# Patient Record
Sex: MALE | Race: WHITE | Employment: OTHER | URBAN - METROPOLITAN AREA
[De-identification: names, ages, dates, MRNs, and addresses within clinical notes are randomized per-mention and may not be internally consistent; named-entity substitution may affect disease eponyms.]

---

## 2017-01-18 PROBLEM — G56.02 CARPAL TUNNEL SYNDROME, LEFT: Status: ACTIVE | Noted: 2017-01-18

## 2018-01-30 PROBLEM — R42 DIZZINESS: Status: ACTIVE | Noted: 2018-01-30

## 2020-10-26 PROBLEM — Z98.890 S/P ROTATOR CUFF REPAIR: Status: ACTIVE | Noted: 2020-10-26

## 2021-10-06 PROBLEM — K63.5 COLON POLYP: Status: ACTIVE | Noted: 2017-11-08

## 2023-07-24 NOTE — PROGRESS NOTES
strength, numbness or tingling. No change in gait, balance, coordination, mood, affect, memory, mentation, behavior. Psychiatric: No anxiety, no depression. Endocrine: No malaise, fatigue or temperature intolerance. No excessive thirst, fluid intake, or urination. No tremor. Hematologic/Lymphatic: No abnormal bruising or bleeding, blood clots or swollen lymph nodes. Allergic/Immunologic: No nasal congestion or hives. Physical Examination:    Vitals:    07/25/23 0819   BP: 110/62   Pulse: 63   SpO2: 96%          Constitutional and General Appearance: NAD   Respiratory:  Normal excursion and expansion without use of accessory muscles  Resp Auscultation: Normal breath sounds without dullness  Cardiovascular: The apical impulses not displaced  Heart tones are crisp and normal  Cervical veins are not engorged  The carotid upstroke is normal in amplitude and contour without delay or bruit  Normal S1S2, No S3, 1-2/6 Murmur  Peripheral pulses are symmetrical and full  There is no clubbing, cyanosis of the extremities. No edema  Femoral Arteries: 2+ and equal  Pedal Pulses: 2+ and equal   Abdomen:  No masses or tenderness  Liver/Spleen: No Abnormalities Noted  Neurological/Psychiatric:  Alert and oriented in all spheres  Moves all extremities well  Exhibits normal gait balance and coordination  No abnormalities of mood, affect, memory, mentation, or behavior are noted    Carotid dopplers 2018  Summary    1. There is moderate plaque seen in the right internal carotid artery with  an estimated diameter reduction of <50%. 2. There is moderate plaque seen in the left internal carotid artery with an  estimated diameter reduction of <50%. 3. The vertebral arteries are patent with antegrade flow bilaterally. Echocardiogram 1/2019  Summary   LV systolic function is normal with EF estimated from 55-60%. No regional wall motion abnormalities are noted. There is mild concentric left ventricular hypertrophy.

## 2023-07-25 ENCOUNTER — OFFICE VISIT (OUTPATIENT)
Dept: CARDIOLOGY CLINIC | Age: 78
End: 2023-07-25
Payer: MEDICARE

## 2023-07-25 VITALS
WEIGHT: 232.5 LBS | HEIGHT: 77 IN | HEART RATE: 63 BPM | SYSTOLIC BLOOD PRESSURE: 110 MMHG | OXYGEN SATURATION: 96 % | BODY MASS INDEX: 27.45 KG/M2 | DIASTOLIC BLOOD PRESSURE: 62 MMHG

## 2023-07-25 DIAGNOSIS — E78.5 HYPERLIPIDEMIA, UNSPECIFIED HYPERLIPIDEMIA TYPE: ICD-10-CM

## 2023-07-25 DIAGNOSIS — I25.10 CORONARY ARTERY DISEASE INVOLVING NATIVE CORONARY ARTERY OF NATIVE HEART WITHOUT ANGINA PECTORIS: ICD-10-CM

## 2023-07-25 DIAGNOSIS — I10 PRIMARY HYPERTENSION: Primary | ICD-10-CM

## 2023-07-25 DIAGNOSIS — R94.31 ABNORMAL EKG: ICD-10-CM

## 2023-07-25 DIAGNOSIS — I65.23 BILATERAL CAROTID ARTERY STENOSIS: ICD-10-CM

## 2023-07-25 DIAGNOSIS — R01.1 MURMUR: ICD-10-CM

## 2023-07-25 PROCEDURE — 3074F SYST BP LT 130 MM HG: CPT | Performed by: INTERNAL MEDICINE

## 2023-07-25 PROCEDURE — 1123F ACP DISCUSS/DSCN MKR DOCD: CPT | Performed by: INTERNAL MEDICINE

## 2023-07-25 PROCEDURE — 3078F DIAST BP <80 MM HG: CPT | Performed by: INTERNAL MEDICINE

## 2023-07-25 PROCEDURE — 99214 OFFICE O/P EST MOD 30 MIN: CPT | Performed by: INTERNAL MEDICINE

## 2023-07-25 RX ORDER — FLUOROURACIL 50 MG/G
CREAM TOPICAL
COMMUNITY

## 2023-07-25 NOTE — PATIENT INSTRUCTIONS
Plan:   ~Recommend an echocardiogram which is an ultrasound of your heart to evaluate heart function, structures and valves. ~Repeat carotid dopplers next summer   Cardiac medications reviewed including indications and pertinent side effects. Medication list updated at this visit. Check blood pressure and heart rate at home a few times per week- keep a log with dates and times and bring to office visit   Regular exercise and following a healthy diet encouraged   Follow up with me in 1 year     Your provider has ordered testing for further evaluation. An order/prescription has been included in your paper work. To schedule outpatient testing, contact Central Scheduling by calling [address] [place] [address] ([phone]).

## 2023-08-15 ENCOUNTER — HOSPITAL ENCOUNTER (OUTPATIENT)
Dept: CARDIOLOGY | Age: 78
Discharge: HOME | End: 2023-08-15
Payer: MEDICARE

## 2023-08-15 DIAGNOSIS — R94.31 ABNORMAL EKG: ICD-10-CM

## 2023-08-15 DIAGNOSIS — R01.1 MURMUR: ICD-10-CM

## 2023-08-15 DIAGNOSIS — I25.10 CORONARY ARTERY DISEASE INVOLVING NATIVE CORONARY ARTERY OF NATIVE HEART WITHOUT ANGINA PECTORIS: ICD-10-CM

## 2023-08-15 DIAGNOSIS — I10 PRIMARY HYPERTENSION: ICD-10-CM

## 2023-08-15 LAB
LV EF: 55 %
LVEF MODALITY: NORMAL

## 2023-08-15 PROCEDURE — 93306 TTE W/DOPPLER COMPLETE: CPT

## 2023-08-17 ENCOUNTER — TELEPHONE (OUTPATIENT)
Dept: CARDIOLOGY CLINIC | Age: 78
End: 2023-08-17

## 2023-08-17 NOTE — TELEPHONE ENCOUNTER
Left message for patient to call back to review echo.  When he calls back let him know-   No significant abnormality on echocardiogram.

## 2023-08-17 NOTE — TELEPHONE ENCOUNTER
Patient last seen 7/25/2023    Dr. Nikunj Sosa- please review echocardiogram     Echocardiogram 8/15/2023  Summary   Left ventricular systolic function is normal with a visually estimated   ejection fraction of 55%   No obvious regional wall motion abnormalities noted. The left ventricle is normal in size with upper limits of normal wall   thickness. Grade I diastolic dysfunction with normal LV pressure   Mild mitral annular calcification. Aortic valve appears sclerotic but opens adequately. Inadequate tricuspid valve regurgitation to estimate systolic pulmonary   artery pressure. ASSESSMENT:  CAD: Elevated CT calcium stable  Abnormal EKG  Carotid artery diease - stable  Hypertension - well controlled   Hyperlipidemia - reviewed. well controlled. Obtains thru PCP  LVH - needs recheck  Murmur - ? a little louder   Palpitations-  pvc/pac. Exacerbated by caffiene. Stable. Rarely takes extra metoprolol prn. Asymptomatic 6 beats of Non sustained ventricular tachycardia in setting of normal LV function - evaluated by EP 2014        Plan:   ~Recommend an echocardiogram which is an ultrasound of your heart to evaluate heart function, structures and valves. ~Repeat carotid dopplers next summer   Cardiac medications reviewed including indications and pertinent side effects. Medication list updated at this visit.  Cont Toprol daily w/ extra as needed   Check blood pressure and heart rate at home a few times per week- keep a log with dates and times and bring to office visit   Regular exercise and following a healthy diet encouraged   Follow up with me in 1 year

## 2023-10-10 ENCOUNTER — HOSPITAL ENCOUNTER (OUTPATIENT)
Dept: PHYSICAL THERAPY | Age: 78
Setting detail: THERAPIES SERIES
Discharge: HOME | End: 2023-10-10
Payer: MEDICARE

## 2023-10-10 PROCEDURE — 97110 THERAPEUTIC EXERCISES: CPT | Performed by: PHYSICAL THERAPIST

## 2023-10-10 PROCEDURE — 97140 MANUAL THERAPY 1/> REGIONS: CPT | Performed by: PHYSICAL THERAPIST

## 2023-10-10 PROCEDURE — 97161 PT EVAL LOW COMPLEX 20 MIN: CPT | Performed by: PHYSICAL THERAPIST

## 2023-10-10 NOTE — FLOWSHEET NOTE
per patient tolerance, in order to progress toward full function and prevent re-injury. Status: [] Progressing: [] Met: [] Not Met: [] Adjusted  Patient will have a decrease in pain to 1/10 to help  facilitate improvement in movement, function, and ADLs as indicated by functional deficits. Status: [] Progressing: [] Met: [] Not Met: [] Adjusted    Long Term Goals: To be achieved in: 6weeks  Disability index score of 0% or less for the Neck Disability Index to assist with return to prior level of function. Status: [] Progressing: [] Met: [] Not Met: [] Adjusted  Improve cervical  AROM to 15 degrees or  better to allow for proper joint functioning as indicated by patients functional deficits. Status: [] Progressing: [] Met: [] Not Met: [] Adjusted  Pt to improve strength to [facility] of deep cervical flexors to allow for proper muscle and joint use in functional mobility, ADLs and prior level of function   Status: [] Progressing: [] Met: [] Not Met: [] Adjusted  Patient will return to Dressing and Driving without increased symptoms or restriction to work towards return to prior level of function. Status: [] Progressing: [] Met: [] Not Met: [] Adjusted  Patient will increase UE function to allow independence in all self-care activities. Patient will resume normal work/leisure activities without pain. Status: [] Progressing: [] Met: [] Not Met: [] Adjusted     Overall Progression Towards Functional goals/ Treatment Progress Update:  [] Patient is progressing as expected towards functional goals listed. [] Progression is slowed due to complexities/Impairments listed. [] Progression has been slowed due to co-morbidities.   [x] Plan just implemented, too soon (<30days) to assess goals progression   [] Goals require adjustment due to lack of progress  [] Patient is not progressing as expected and requires additional follow up with physician  [] Other:     CHARGE CAPTURE     CHARGE GRID   CPT Code

## 2023-10-17 ENCOUNTER — HOSPITAL ENCOUNTER (OUTPATIENT)
Dept: PHYSICAL THERAPY | Age: 78
Setting detail: THERAPIES SERIES
Discharge: HOME | End: 2023-10-17
Payer: MEDICARE

## 2023-10-17 PROCEDURE — 97140 MANUAL THERAPY 1/> REGIONS: CPT | Performed by: PHYSICAL THERAPIST

## 2023-10-17 PROCEDURE — 97110 THERAPEUTIC EXERCISES: CPT | Performed by: PHYSICAL THERAPIST

## 2023-10-17 NOTE — FLOWSHEET NOTE
due to lack of progress  [] Patient is not progressing as expected and requires additional follow up with physician  [] Other:     CHARGE CAPTURE     CHARGE GRID   CPT Code (TIMED) minutes # CPT Code (UNTIMED) #     [x] Therex (55167)  30   [] EVAL:LOW (42771 - Typically 20 minutes face-to-face) 20    [] Neuromusc. Re-ed (02115)    [] Re-Eval (81815)     [x] Manual (32526) 10   [] Estim Unattended (45378)     [] Ther. Act (96409)    [] Shelley Oyster. Traction (20369)     [] Gait (46225)    [] Dry Needle 1-2 muscle (68540)     [] Aquatic Therex (59964)    [] Dry Needle 3+ muscle (42035)     [] Iontophoresis (06128)    [] VASO (42836)     [] Ultrasound (16761)    [] Group Therapy (00452)     [] Estim Attended (92600)    [x] Other:MHP 10   Total Timed Code Tx Minutes 40        Total Treatment Minutes 40        Charge Justification:  (92424) THERAPEUTIC EXERCISE - Provided verbal/tactile cueing for activities related to strengthening, flexibility, endurance, ROM performed to prevent loss of range of motion, maintain or improve muscular strength or increase flexibility, following either an injury or surgery. (11703) [facility]- Reviewed/Progressed HEP activities related to strengthening, flexibility, endurance, ROM performed to prevent loss of range of motion, maintain or improve muscular strength or increase flexibility, following either an injury or surgery.   (26445) MANUAL THERAPY-  Manual therapy techniques, 1 or more regions, each 15 minutes (Mobilization/manipulation, manual lymphatic drainage, manual traction) for the purpose of modulating pain, promoting relaxation,  increasing ROM, reducing/eliminating soft tissue swelling/inflammation/restriction, improving soft tissue extensibility and allowing for proper ROM for normal function with self care, mobility, lifting and ambulation    TREATMENT PLAN   Plan: POC Initiated today- see eval for details    Electronically Signed by Efra Thompson PT

## 2023-10-19 ENCOUNTER — HOSPITAL ENCOUNTER (OUTPATIENT)
Age: 78
Discharge: HOME | End: 2023-10-19
Payer: MEDICARE

## 2023-10-19 LAB
ALBUMIN SERPL-MCNC: 4.1 G/DL (ref 3.4–5)
ALBUMIN/GLOB SERPL: 1.6 {RATIO} (ref 1.1–2.2)
ALP SERPL-CCNC: 73 U/L (ref 40–129)
ALT SERPL-CCNC: 30 U/L (ref 10–40)
ANION GAP SERPL CALCULATED.3IONS-SCNC: 11 MMOL/L (ref 3–16)
AST SERPL-CCNC: 35 U/L (ref 15–37)
BILIRUB SERPL-MCNC: 0.7 MG/DL (ref 0–1)
BUN SERPL-MCNC: 16 MG/DL (ref 7–20)
CALCIUM SERPL-MCNC: 8.8 MG/DL (ref 8.3–10.6)
CHLORIDE SERPL-SCNC: 104 MMOL/L (ref 99–110)
CHOLEST SERPL-MCNC: 104 MG/DL (ref 0–199)
CO2 SERPL-SCNC: 25 MMOL/L (ref 21–32)
CREAT SERPL-MCNC: 0.8 MG/DL (ref 0.8–1.3)
GFR SERPLBLD CREATININE-BSD FMLA CKD-EPI: >60 ML/MIN/{1.73_M2}
GLUCOSE SERPL-MCNC: 92 MG/DL (ref 70–99)
HDLC SERPL-MCNC: 55 MG/DL (ref 40–60)
LDLC SERPL CALC-MCNC: 39 MG/DL
POTASSIUM SERPL-SCNC: 4.8 MMOL/L (ref 3.5–5.1)
PROT SERPL-MCNC: 6.7 G/DL (ref 6.4–8.2)
SODIUM SERPL-SCNC: 140 MMOL/L (ref 136–145)
TRIGL SERPL-MCNC: 52 MG/DL (ref 0–150)
VLDLC SERPL CALC-MCNC: 10 MG/DL

## 2023-10-19 PROCEDURE — 80053 COMPREHEN METABOLIC PANEL: CPT

## 2023-10-19 PROCEDURE — 80061 LIPID PANEL: CPT

## 2023-10-19 PROCEDURE — 83036 HEMOGLOBIN GLYCOSYLATED A1C: CPT

## 2023-10-19 PROCEDURE — 36415 COLL VENOUS BLD VENIPUNCTURE: CPT

## 2023-10-20 LAB
EST. AVERAGE GLUCOSE BLD GHB EST-MCNC: 116.9 MG/DL
HBA1C MFR BLD: 5.7 %

## 2023-10-25 ENCOUNTER — HOSPITAL ENCOUNTER (OUTPATIENT)
Dept: PHYSICAL THERAPY | Age: 78
Setting detail: THERAPIES SERIES
Discharge: HOME | End: 2023-10-25
Payer: MEDICARE

## 2023-10-25 NOTE — FLOWSHEET NOTE
[facility], [address] [place] [address]  [address] [place] [address], [address] [place] [address], [address] [place] [address]        Physical Therapy  Cancellation/No-show Note  Patient Name:  Beata Streeter.   :  1945   Date:  10/25/2023  Cancelled visits to date: 1  No-shows to date: 0    For today's appointment patient:  [x]  Cancelled  []  Rescheduled appointment  []  No-show     Reason given by patient:  [x]  Patient ill  []  Conflicting appointment  []  No transportation    []  Conflict with work  []  No reason given  []  Other:     Comments:      Electronically signed by:  Aster Larose, PT

## 2023-10-31 DIAGNOSIS — R00.2 PALPITATIONS: ICD-10-CM

## 2023-10-31 RX ORDER — METOPROLOL SUCCINATE 25 MG/1
25 TABLET, EXTENDED RELEASE ORAL SEE ADMIN INSTRUCTIONS
Qty: 180 TABLET | Refills: 2 | Status: SHIPPED | OUTPATIENT
Start: 2023-10-31

## 2023-10-31 NOTE — TELEPHONE ENCOUNTER
Pt is requesting refill of Metoprolol 25mg.  Preferred pharmacy is    [facility] [id number] [place], [place] - [address]     Last ov 07/25/2023 [facility]

## 2023-11-01 ENCOUNTER — APPOINTMENT (OUTPATIENT)
Dept: PHYSICAL THERAPY | Age: 78
End: 2023-11-01
Payer: MEDICARE

## 2023-11-08 ENCOUNTER — HOSPITAL ENCOUNTER (OUTPATIENT)
Dept: PHYSICAL THERAPY | Age: 78
Setting detail: THERAPIES SERIES
Discharge: HOME | End: 2023-11-08
Payer: MEDICARE

## 2023-11-08 PROCEDURE — 97140 MANUAL THERAPY 1/> REGIONS: CPT | Performed by: PHYSICAL THERAPIST

## 2023-11-08 PROCEDURE — 97110 THERAPEUTIC EXERCISES: CPT | Performed by: PHYSICAL THERAPIST

## 2023-11-08 NOTE — FLOWSHEET NOTE
[address] [facility] Therapy  [address] office: [phone] fax: [phone]      Physical Therapy: TREATMENT/PROGRESS NOTE   Patient: Emilee Benavidez (78 y.o. male)   Treatment Date: 2023   :  1945 MRN: [id number]   Visit #: 3   Insurance Allowable Auth Needed    []Yes    []No    Insurance: Payor: MEDICARE / Plan: MEDICARE PART A AND B / Product Type: *No Product type* /   Insurance ID: [id number] - (Medicare)  Secondary Insurance (if applicable): BCBS   Treatment Diagnosis:   Cervical pain M54.2   Medical Diagnosis:    Other cervical disc degeneration, unspecified cervical region [M50.30]  Spondylolysis, cervical region [M43.02]   Referring Physician: Dulce Maria Avalos MD  PCP: Johana Kapadia MD                             Plan of care signed (Y/N):     Date of Patient follow up with Physician:      Progress Report/POC: NO  POC update due: 2023 (OR 10 visits /OR [address], whichever is less)    Latex Allergy:  [x]NO      []YES    Preferred Language for Healthcare:   [x]English       []other:    SUBJECTIVE EXAMINATION     Patient Report/Comments:  pt was sick with Covid. Pt did try to keep up with exercises. Pt does not notice a change in ROM    OBJECTIVE EXAMINATION     Observation:     Test measurements: see eval     Test used Initial score  10/10/23 2023   Pain Summary VAS 2-5/10    Functional questionnaire Neck Disability Index 6= 12%    Other:              RESTRICTIONS/PRECAUTIONS: lumbar injections. Hip replacement,  knee scope, left shoulder RTC repair, lumbar surgery.   HNP    Exercises/Interventions:     Therapeutic Ex (90283)  resistance Sets/time Reps Notes/Cues/Progressions          Supine chin tuck  :05 X 10 hep   Supine cervical rotation with OP hep   LTR with oppo UE hep   Seated chin tuck hep   SBS hep   Cervical flx with rotation 3 fingers  X 10 hep   Supine pec s  :20 3x hep   Wall walks

## 2023-11-15 ENCOUNTER — HOSPITAL ENCOUNTER (OUTPATIENT)
Dept: PHYSICAL THERAPY | Age: 78
Setting detail: THERAPIES SERIES
Discharge: HOME | End: 2023-11-15
Payer: MEDICARE

## 2023-11-15 PROCEDURE — 97110 THERAPEUTIC EXERCISES: CPT | Performed by: PHYSICAL THERAPIST

## 2023-11-15 PROCEDURE — 97140 MANUAL THERAPY 1/> REGIONS: CPT | Performed by: PHYSICAL THERAPIST

## 2023-11-15 NOTE — FLOWSHEET NOTE
[address] [facility] Therapy  [address] office: [phone] fax: [phone]      Physical Therapy: TREATMENT/PROGRESS NOTE   Patient: Noy Perez. (78 y.o. male)   Treatment Date: 11/15/2023   :  1945 MRN: [id number]   Visit #: 4   Insurance Allowable Auth Needed    []Yes    []No    Insurance: Payor: MEDICARE / Plan: MEDICARE PART A AND B / Product Type: *No Product type* /   Insurance ID: [id number] - (Medicare)  Secondary Insurance (if applicable): BCBS   Treatment Diagnosis:   Cervical pain M54.2   Medical Diagnosis:    Other cervical disc degeneration, unspecified cervical region [M50.30]  Spondylolysis, cervical region [M43.02]   Referring Physician: Collette Lutes, MD  PCP: Trav Barros MD                             Plan of care signed (Y/N):     Date of Patient follow up with Physician:      Progress Report/POC: NO  POC update due: 2023 (OR 10 visits /OR Mario Wilson, whichever is less)    Latex Allergy:  [x]NO      []YES    Preferred Language for Healthcare:   [x]English       []other:    SUBJECTIVE EXAMINATION     Patient Report/Comments:  Pt does get cracking in the neck, most commonly with looking at his phone. OBJECTIVE EXAMINATION     Observation:     Test measurements: see eval     Test used Initial score  10/10/23 11/15/2023   Pain Summary VAS 2-5/10    Functional questionnaire Neck Disability Index 6= 12%    Other:              RESTRICTIONS/PRECAUTIONS: lumbar injections. Hip replacement,  knee scope, left shoulder RTC repair, lumbar surgery.   HNP    Exercises/Interventions:     Therapeutic Ex (89736)  resistance Sets/time Reps Notes/Cues/Progressions          Supine chin tuck  :05 X 15 hep   Supine cervical rotation with OP hep   LTR with oppo UE   X 10 hep   Supine horiz abd GR  2 x 10    Seated chin tuck hep   SBS hep   Cervical flx with rotation 3 fingers  X 10 hep   Supine pec s  :20 3x

## 2023-11-22 ENCOUNTER — HOSPITAL ENCOUNTER (OUTPATIENT)
Dept: PHYSICAL THERAPY | Age: 78
Setting detail: THERAPIES SERIES
Discharge: HOME | End: 2023-11-22
Payer: MEDICARE

## 2023-11-22 PROCEDURE — 97110 THERAPEUTIC EXERCISES: CPT | Performed by: PHYSICAL THERAPIST

## 2023-11-22 PROCEDURE — 97140 MANUAL THERAPY 1/> REGIONS: CPT | Performed by: PHYSICAL THERAPIST

## 2023-11-22 PROCEDURE — 20560 NDL INSJ W/O NJX 1 OR 2 MUSC: CPT | Performed by: PHYSICAL THERAPIST

## 2023-11-22 NOTE — FLOWSHEET NOTE
Rosey    Exercises  - Supine Chin Tuck  - 2 x daily - 7 x weekly - 1 sets - 10 reps - 5 hold  - Supine Cervical Rotation AROM on Pillow  - 2 x daily - 7 x weekly - 1 sets - 10 reps - 2 hold  - Supine Upper Trunk and Cervical Rotation with Opposite Lower Trunk Rotation  - 2 x daily - 7 x weekly - 1 sets - 10 reps - 2 hold  - Seated Cervical Retraction Passive Loaded  - 2 x daily - 7 x weekly - 1 sets - 10 reps - 5 hold  - Seated Scapular Retraction  - 2 x daily - 7 x weekly - 1 sets - 10 reps - 5 hold  - 3 Finger Cervical Rotation  - 2 x daily - 7 x weekly - 1 sets - 10 reps - 2 hold      ASSESSMENT     Today's Assessment: Patient tolerated dry needling and activity well. Pt does demonstrate indep with current program.  Responds well to cueing to decrease forward head/ chin tuck. Overall movement of cervical spine is quite limited    Medical Necessity Documentation:  I certify that this patient meets the below criteria necessary for medical necessity for care and/or justification of therapy services: The patient has a musculoskeletal condition(s) with a corresponding ICD-10 code that is of complexity and severity that require skilled therapeutic intervention. This has a direct and significant impact on the need for therapy and significantly impacts the rate of recovery. Treatment/Activity Tolerance:  [x] Patient tolerated treatment well [] Patient limited by fatique  [] Patient limited by pain  [] Patient limited by other medical complications  [] Other:     Return to Play: NA    Prognosis for POC: [x] Good [] Fair  [] Poor    Patient requires continued skilled intervention: [x] Yes  [] No      GOALS     Therapist goals for Patient:   Short Term Goals: To be achieved in: 2 weeks  Independent in HEP and progression per patient tolerance, in order to progress toward full function and prevent re-injury.     Status: [] Progressing: [x] Met: [] Not Met: [] Adjusted  Patient will have a decrease in pain to 1/10 to None

## 2023-11-28 ENCOUNTER — HOSPITAL ENCOUNTER (OUTPATIENT)
Dept: PHYSICAL THERAPY | Age: 78
Setting detail: THERAPIES SERIES
Discharge: HOME | End: 2023-11-28
Payer: MEDICARE

## 2023-11-28 PROCEDURE — 20560 NDL INSJ W/O NJX 1 OR 2 MUSC: CPT | Performed by: PHYSICAL THERAPIST

## 2023-11-28 PROCEDURE — 97110 THERAPEUTIC EXERCISES: CPT | Performed by: PHYSICAL THERAPIST

## 2023-11-28 PROCEDURE — 97140 MANUAL THERAPY 1/> REGIONS: CPT | Performed by: PHYSICAL THERAPIST

## 2023-11-28 NOTE — FLOWSHEET NOTE
[address] [facility] Therapy  [address] office: [phone] fax: [phone]      Physical Therapy: TREATMENT/PROGRESS NOTE   Patient: Susan Gamble (78 y.o. male)   Treatment Date: 2023   :  1945 MRN: [id number]   Visit #: 6   Insurance Allowable Auth Needed    []Yes    []No    Insurance: Payor: MEDICARE / Plan: MEDICARE PART A AND B / Product Type: *No Product type* /   Insurance ID: [id number] - (Medicare)  Secondary Insurance (if applicable): BCBS   Treatment Diagnosis:   Cervical pain M54.2   Medical Diagnosis:    Other cervical disc degeneration, unspecified cervical region [M50.30]  Spondylolysis, cervical region [M43.02]   Referring Physician: Kourtney Waters MD  PCP: Carlos Dueñas MD                             Plan of care signed (Y/N):     Date of Patient follow up with Physician: Valery thompson     Progress Report/POC: NO  POC update due: 2023 (OR 10 visits /OR [address], whichever is less)    Latex Allergy:  [x]NO      []YES    Preferred Language for Healthcare:   [x]English       []other:    SUBJECTIVE EXAMINATION     Patient Report/Comments:  Pt reports the needling helped. He reports the painful clicks have really decreased since needling. He no longer has pain driving, but still has some putting on a shirt    OBJECTIVE EXAMINATION     Observation:     Test measurements: see eval     Test used Initial score  10/10/23 2023   Pain Summary VAS 2-5/10 3/10   Functional questionnaire Neck Disability Index 6= 12%    Other:              RESTRICTIONS/PRECAUTIONS: lumbar injections. Hip replacement,  knee scope, left shoulder RTC repair, lumbar surgery.   HNP    Exercises/Interventions:     Therapeutic Ex (10000)  resistance Sets/time Reps Notes/Cues/Progressions          Supine chin tuck  :05 X 10 hep   Supine cervical rotation with OP hep   LTR with oppo UE  hep   Supine horiz abd GR  1 x 15

## 2023-12-06 ENCOUNTER — HOSPITAL ENCOUNTER (OUTPATIENT)
Dept: PHYSICAL THERAPY | Age: 78
Setting detail: THERAPIES SERIES
Discharge: HOME | End: 2023-12-06
Payer: MEDICARE

## 2023-12-06 PROCEDURE — 97110 THERAPEUTIC EXERCISES: CPT | Performed by: PHYSICAL THERAPIST

## 2023-12-06 PROCEDURE — 97140 MANUAL THERAPY 1/> REGIONS: CPT | Performed by: PHYSICAL THERAPIST

## 2023-12-06 NOTE — FLOWSHEET NOTE
[address] [facility] Therapy  [address] office: [phone] fax: [phone]      Physical Therapy: TREATMENT/PROGRESS NOTE   Patient: Yasemin Ruff (78 y.o. male)   Treatment Date: 2023   :  1945 MRN: [id number]   Visit #: 7   Insurance Allowable Auth Needed    []Yes    []No    Insurance: Payor: MEDICARE / Plan: MEDICARE PART A AND B / Product Type: *No Product type* /   Insurance ID: [id number] - (Medicare)  Secondary Insurance (if applicable): BCBS   Treatment Diagnosis:   Cervical pain M54.2   Medical Diagnosis:    Other cervical disc degeneration, unspecified cervical region [M50.30]  Spondylolysis, cervical region [M43.02]   Referring Physician: Satinder Hall MD  PCP: Zee Donis MD                             Plan of care signed (Y/N):     Date of Patient follow up with Physician: Amber Valdez prkatheryn     Progress Report/POC: NO  POC update due: 2023 (OR 10 visits /OR [address], whichever is less)    Latex Allergy:  [x]NO      []YES    Preferred Language for Healthcare:   [x]English       []other:    SUBJECTIVE EXAMINATION     Patient Report/Comments:  pt feels that needling did help alleviate pain. Pt does have left side cervical discomfort. OBJECTIVE EXAMINATION     Observation:     Test measurements: see eval     Test used Initial score  10/10/23 2023   Pain Summary VAS 2-5/10 3/10   Functional questionnaire Neck Disability Index 6= 12%    Other:              RESTRICTIONS/PRECAUTIONS: lumbar injections. Hip replacement,  knee scope, left shoulder RTC repair, lumbar surgery.   HNP    Exercises/Interventions:     Therapeutic Ex (33582)  resistance Sets/time Reps Notes/Cues/Progressions          Supine chin tuck  :05 X 10 hep   Supine cervical rotation with OP   X 5 hep   LTR with oppo UE   X 10 hep      Seated chin tuck hep   SBS hep   Cervical flx with rotation/ then sidebend 3 fingers  X 10

## 2023-12-11 ENCOUNTER — HOSPITAL ENCOUNTER (OUTPATIENT)
Dept: PHYSICAL THERAPY | Age: 78
Setting detail: THERAPIES SERIES
Discharge: HOME | End: 2023-12-11
Payer: MEDICARE

## 2023-12-11 PROCEDURE — 20560 NDL INSJ W/O NJX 1 OR 2 MUSC: CPT | Performed by: PHYSICAL THERAPIST

## 2023-12-11 PROCEDURE — 97140 MANUAL THERAPY 1/> REGIONS: CPT | Performed by: PHYSICAL THERAPIST

## 2023-12-11 PROCEDURE — 97110 THERAPEUTIC EXERCISES: CPT | Performed by: PHYSICAL THERAPIST

## 2023-12-11 NOTE — FLOWSHEET NOTE
[address] [facility] Therapy  [address] office: [phone] fax: [phone]      Physical Therapy: TREATMENT/PROGRESS NOTE   Patient: Farhana Witt (78 y.o. male)   Treatment Date: 2023   :  1945 MRN: [id number]   Visit #: 8   Insurance Allowable Auth Needed    []Yes    []No    Insurance: Payor: MEDICARE / Plan: MEDICARE PART A AND B / Product Type: *No Product type* /   Insurance ID: [id number] - (Medicare)  Secondary Insurance (if applicable): BCBS   Treatment Diagnosis:   Cervical pain M54.2   Medical Diagnosis:    Other cervical disc degeneration, unspecified cervical region [M50.30]  Spondylolysis, cervical region [M43.02]   Referring Physician: Izzy Snider MD  PCP: Angel Ferrera MD                             Plan of care signed (Y/N):     Date of Patient follow up with Physician: Ellie thompson     Progress Report/POC: NO  POC update due: 2023 (OR 10 visits /OR [address], whichever is less)    Latex Allergy:  [x]NO      []YES    Preferred Language for Healthcare:   [x]English       []other:    SUBJECTIVE EXAMINATION     Patient Report/Comments:  Patient reports the needling helped and he would like to do that again today. He reports he feels about 50% improved overall. Having less painful episodes. OBJECTIVE EXAMINATION     Observation:     Test measurements: see eval     Test used Initial score  10/10/23 2023   Pain Summary VAS 2-5/10 3/10   Functional questionnaire Neck Disability Index 6= 12%    Other:              RESTRICTIONS/PRECAUTIONS: lumbar injections. Hip replacement,  knee scope, left shoulder RTC repair, lumbar surgery.   HNP    Exercises/Interventions:     Therapeutic Ex (85842)  resistance Sets/time Reps Notes/Cues/Progressions          Supine chin tuck  :05 X 10 hep   Supine cervical rotation with OP   hep   LTR with oppo UE   X 10 hep      Seated chin tuck hep   SBS hep

## 2024-01-02 ENCOUNTER — HOSPITAL ENCOUNTER (OUTPATIENT)
Dept: PHYSICAL THERAPY | Age: 79
Setting detail: THERAPIES SERIES
Discharge: HOME | End: 2024-01-02
Payer: MEDICARE

## 2024-01-02 PROCEDURE — 97110 THERAPEUTIC EXERCISES: CPT | Performed by: PHYSICAL THERAPIST

## 2024-01-02 PROCEDURE — 20560 NDL INSJ W/O NJX 1 OR 2 MUSC: CPT | Performed by: PHYSICAL THERAPIST

## 2024-01-02 PROCEDURE — 97140 MANUAL THERAPY 1/> REGIONS: CPT | Performed by: PHYSICAL THERAPIST

## 2024-01-02 NOTE — FLOWSHEET NOTE
[facility]  [address], [place] [address] office: [phone] fax: [phone]         Physical Therapy: TREATMENT/PROGRESS NOTE   Patient: Erick Soto Jr. (78 y.o. male)   Treatment Date: 2024   :  1945 MRN: [id number]   Visit #: 10   Insurance Allowable Auth Needed    []Yes    []No    Insurance: Payor: MEDICARE / Plan: MEDICARE PART A AND B / Product Type: *No Product type* /   Insurance ID: [id number] - (Medicare)  Secondary Insurance (if applicable): BCBS   Treatment Diagnosis:   Cervical pain M54.2   Medical Diagnosis:    Other cervical disc degeneration, unspecified cervical region [M50.30]  Spondylolysis, cervical region [M43.02]   Referring Physician: DANNY Gorman MD  PCP: Abraham Estrada MD                             Plan of care signed (Y/N):     Date of Patient follow up with Physician: Sherif thompson     Progress Report/POC: NO  POC update due: 2024 (OR 10 visits /OR AUTH LIMITS, whichever is less)    Latex Allergy:  [x]NO      []YES    Preferred Language for Healthcare:   [x]English       []other:    SUBJECTIVE EXAMINATION     Patient Report/Comments:  Pt reports he has been performing the HEP every day.  With the hectic holidays he has not felt as good as he would have liked.  He has noticed the noise and \"creaking\" in the neck over the last couple of weeks.     OBJECTIVE EXAMINATION     Observation:     Test measurements: see eval     Test used Initial score  10/10/23 2023  POC   Pain Summary VAS 2-5/10 3/10   Functional questionnaire Neck Disability Index 6= 12% 5 = 10%   Other: Cervical flex 30 38    ext 40 35    SB  L 10 /  R 20 15 / 18    Rot   L  20/ R 41 30 / 51     RESTRICTIONS/PRECAUTIONS: lumbar injections.   Hip replacement,  knee scope, left shoulder RTC repair, lumbar surgery.  HNP    Exercises/Interventions:     Therapeutic Ex (37425)  resistance Sets/time Reps

## 2024-01-18 ENCOUNTER — HOSPITAL ENCOUNTER (OUTPATIENT)
Dept: PHYSICAL THERAPY | Age: 79
Setting detail: THERAPIES SERIES
Discharge: HOME | End: 2024-01-18
Payer: MEDICARE

## 2024-01-18 PROCEDURE — 97140 MANUAL THERAPY 1/> REGIONS: CPT | Performed by: PHYSICAL THERAPIST

## 2024-01-18 PROCEDURE — 97110 THERAPEUTIC EXERCISES: CPT | Performed by: PHYSICAL THERAPIST

## 2024-01-18 NOTE — PLAN OF CARE
initial POC   [x]Other: patient is doing really well in regards to meeting goals set, and improving his ability to perform activity.  The dry needling was very effective in decreasing upper trap pain and improving ROM    Total Visits: 11     Recommendation:    [x] Continue PT 1x / wk for 1-2 weeks.   [] Hold PT, pending MD visit   [] Discharge to [facility]. Follow up with PT or MD PRN.          Physical Therapy: TREATMENT/PROGRESS NOTE   Patient: Erick Soto Jr. (79 y.o. male)   Treatment Date: 2024   :  1945 MRN: [id number]   Visit #: 11   Insurance Allowable Auth Needed    []Yes    []No    Insurance: Payor: MEDICARE / Plan: MEDICARE PART A AND B / Product Type: *No Product type* /   Insurance ID: [id number] - (Medicare)  Secondary Insurance (if applicable): BCBS   Treatment Diagnosis:   Cervical pain M54.2   Medical Diagnosis:    Other cervical disc degeneration, unspecified cervical region [M50.30]  Spondylolysis, cervical region [M43.02]   Referring Physician: DANNY Gorman MD  PCP: Abraham Estrada MD                             Plan of care signed (Y/N):     Date of Patient follow up with Physician: Sherif thompson     Progress Report/POC: YES  POC update due: 2024 (OR 10 visits /OR AUTH LIMITS, whichever is less)    Latex Allergy:  [x]NO      []YES    Preferred Language for Healthcare:   [x]English       []other:    SUBJECTIVE EXAMINATION     Patient Report/Comments:  Pt reports his neck has been feeling pretty good.  He thinks he wants to skip the needling today,, but he reports the last time we did needling the needle that was most lateral to his neck was most effective.  Overall feels about 60-70% improved.  He still lacks ROM, but knows the arthritis limiting that.  He notes he is able to tilt his head back to gargle.  He rates his pain typically 3/10    OBJECTIVE EXAMINATION     Observation:     Test measurements: see eval     Test used Initial score  10/10/23 2023  POC

## 2024-02-01 ENCOUNTER — HOSPITAL ENCOUNTER (OUTPATIENT)
Dept: PHYSICAL THERAPY | Age: 79
Setting detail: THERAPIES SERIES
Discharge: HOME | End: 2024-02-01
Payer: MEDICARE

## 2024-02-01 PROCEDURE — 97140 MANUAL THERAPY 1/> REGIONS: CPT | Performed by: PHYSICAL THERAPIST

## 2024-02-01 PROCEDURE — 97110 THERAPEUTIC EXERCISES: CPT | Performed by: PHYSICAL THERAPIST

## 2024-02-01 NOTE — FLOWSHEET NOTE
Dry Needle 1-2 muscle (57323)     [] Aquatic Therex (57190)    [] Dry Needle 3+ muscle (93405)     [] Iontophoresis (76670)    [] VASO (89155)     [] Ultrasound (83728)    [] Group Therapy (32101)     [] Estim Attended (65977)       Total Timed Code Tx Minutes 40        Total Treatment Minutes 40        Charge Justification:  (59495) THERAPEUTIC EXERCISE - Provided verbal/tactile cueing for activities related to strengthening, flexibility, endurance, ROM performed to prevent loss of range of motion, maintain or improve muscular strength or increase flexibility, following either an injury or surgery.   (86555) HOME EXERCISE PROGRAM - Reviewed/Progressed HEP activities related to strengthening, flexibility, endurance, ROM performed to prevent loss of range of motion, maintain or improve muscular strength or increase flexibility, following either an injury or surgery.  (45269) MANUAL THERAPY -  Manual therapy techniques, 1 or more regions, each 15 minutes (Mobilization/manipulation, manual lymphatic drainage, manual traction) for the purpose of modulating pain, promoting relaxation,  increasing ROM, reducing/eliminating soft tissue swelling/inflammation/restriction, improving soft tissue extensibility and allowing for proper ROM for normal function with self care, mobility, lifting and ambulation    TREATMENT PLAN   Plan: Discharge to home program    Electronically Signed by Danielle Arita PT              Date: 02/01/2024     Note: If patient does not return for scheduled/recommended follow up visits, this note will serve as a discharge from care along with the most recent update on progress.

## 2024-04-21 NOTE — PROGRESS NOTES
ejection fraction of 55%   No obvious regional wall motion abnormalities noted.   The left ventricle is normal in size with upper limits of normal wall   thickness.   Grade I diastolic dysfunction with normal LV pressure   Mild mitral annular calcification.   Aortic valve appears sclerotic but opens adequately.   Inadequate tricuspid valve regurgitation to estimate systolic pulmonary   artery pressure.      Latest Reference Range & Units 10/19/23 09:16   Cholesterol, Total 0 - 199 mg/dL 104   HDL Cholesterol 40 - 60 mg/dL 55   LDL Calculated <100 mg/dL 39   Triglycerides 0 - 150 mg/dL 52   VLDL Cholesterol Calculated Not Established mg/dL 10       ASSESSMENT:  CAD: based on elevated CT calcium. stable  Abnormal EKG  Carotid artery diease - stable  Hypertension - well controlled   Hyperlipidemia - reviewed. well controlled. Obtains thru PCP  LVH - needs recheck  Murmur - stable  Palpitations-  pvc/pac. Episodic. Recent increas, now back to baselin. Exacerbated by caffiene. Stable. Occ takes extra metoprolol prn.  Asymptomatic 6 beats of Non sustained ventricular tachycardia in setting of normal LV function - evaluated by EP 2014  Aortic valve sclerosis      Plan:   ~Labs were just checked through PCP   ~Ok to take an extra Metoprolol as needed for increased palpitations   ~Being sick, increase in stress, and caffeine can increase palpitations   ~Decrease Lisinopril to 10 mg daily due to low blood pressure   Cardiac medications reviewed including indications and pertinent side effects. Medication list updated at this visit.   Patient verbalizes understanding of the need for treatment and education has been provided at today's visit. Additional education material will be provided in after visit summary.    Check blood pressure and heart rate at home a few times per week- keep a log with dates and times and bring to office visit   Regular exercise and following a healthy diet encouraged   Follow up with me in 1 year

## 2024-04-22 ENCOUNTER — OFFICE VISIT (OUTPATIENT)
Dept: CARDIOLOGY CLINIC | Age: 79
End: 2024-04-22
Payer: MEDICARE

## 2024-04-22 VITALS
OXYGEN SATURATION: 98 % | BODY MASS INDEX: 27.39 KG/M2 | SYSTOLIC BLOOD PRESSURE: 100 MMHG | WEIGHT: 232 LBS | DIASTOLIC BLOOD PRESSURE: 58 MMHG | HEART RATE: 68 BPM | HEIGHT: 77 IN

## 2024-04-22 DIAGNOSIS — R00.2 PALPITATIONS: ICD-10-CM

## 2024-04-22 DIAGNOSIS — I10 PRIMARY HYPERTENSION: Primary | ICD-10-CM

## 2024-04-22 DIAGNOSIS — I25.10 CORONARY ARTERY DISEASE INVOLVING NATIVE CORONARY ARTERY OF NATIVE HEART WITHOUT ANGINA PECTORIS: ICD-10-CM

## 2024-04-22 DIAGNOSIS — I45.10 RBBB: ICD-10-CM

## 2024-04-22 DIAGNOSIS — R94.31 ABNORMAL EKG: ICD-10-CM

## 2024-04-22 DIAGNOSIS — E78.5 HYPERLIPIDEMIA, UNSPECIFIED HYPERLIPIDEMIA TYPE: ICD-10-CM

## 2024-04-22 PROCEDURE — 3074F SYST BP LT 130 MM HG: CPT | Performed by: INTERNAL MEDICINE

## 2024-04-22 PROCEDURE — G8419 CALC BMI OUT NRM PARAM NOF/U: HCPCS | Performed by: INTERNAL MEDICINE

## 2024-04-22 PROCEDURE — 1036F TOBACCO NON-USER: CPT | Performed by: INTERNAL MEDICINE

## 2024-04-22 PROCEDURE — 1123F ACP DISCUSS/DSCN MKR DOCD: CPT | Performed by: INTERNAL MEDICINE

## 2024-04-22 PROCEDURE — 3078F DIAST BP <80 MM HG: CPT | Performed by: INTERNAL MEDICINE

## 2024-04-22 PROCEDURE — 99214 OFFICE O/P EST MOD 30 MIN: CPT | Performed by: INTERNAL MEDICINE

## 2024-04-22 PROCEDURE — G8427 DOCREV CUR MEDS BY ELIG CLIN: HCPCS | Performed by: INTERNAL MEDICINE

## 2024-04-22 RX ORDER — MELOXICAM 15 MG/1
TABLET ORAL
COMMUNITY
Start: 2024-04-15

## 2024-04-22 RX ORDER — METOPROLOL SUCCINATE 25 MG/1
25 TABLET, EXTENDED RELEASE ORAL SEE ADMIN INSTRUCTIONS
Qty: 180 TABLET | Refills: 2 | Status: SHIPPED | OUTPATIENT
Start: 2024-04-22

## 2024-04-22 RX ORDER — LISINOPRIL 10 MG/1
10 TABLET ORAL DAILY
Qty: 90 TABLET | Refills: 3 | Status: SHIPPED | OUTPATIENT
Start: 2024-04-22

## 2024-04-22 NOTE — PATIENT INSTRUCTIONS
adán:   ~Labs were just checked through PCP   ~Ok to take an extra Metoprolol for palpitations   ~Being sick, increase in stress, and caffeine can increase palpitations   ~Decrease Lisinopril to 10 mg daily due to low blood pressure   Cardiac medications reviewed including indications and pertinent side effects. Medication list updated at this visit.   Patient verbalizes understanding of the need for treatment and education has been provided at today's visit. Additional education material will be provided in after visit summary.    Check blood pressure and heart rate at home a few times per week- keep a log with dates and times and bring to office visit   Regular exercise and following a healthy diet encouraged   Follow up with me in 1 year

## 2024-05-22 ENCOUNTER — HOSPITAL ENCOUNTER (OUTPATIENT)
Dept: VASCULAR LAB | Age: 79
Discharge: HOME | End: 2024-05-24
Attending: INTERNAL MEDICINE
Payer: MEDICARE

## 2024-05-22 DIAGNOSIS — I65.23 BILATERAL CAROTID ARTERY STENOSIS: ICD-10-CM

## 2024-05-22 PROCEDURE — 93880 EXTRACRANIAL BILAT STUDY: CPT

## 2024-05-23 LAB
VAS LEFT ARM BP: 113 MMHG
VAS LEFT CCA DIST EDV: 34.2 CM/S
VAS LEFT CCA DIST PSV: 110 CM/S
VAS LEFT CCA MID EDV: 27.6 CM/S
VAS LEFT CCA MID PSV: 100 CM/S
VAS LEFT CCA PROX EDV: 24.6 CM/S
VAS LEFT CCA PROX PSV: 95.4 CM/S
VAS LEFT ECA EDV: 8.4 CM/S
VAS LEFT ECA PSV: 70.8 CM/S
VAS LEFT ICA DIST EDV: 29.4 CM/S
VAS LEFT ICA DIST PSV: 85.2 CM/S
VAS LEFT ICA MID EDV: 27 CM/S
VAS LEFT ICA MID PSV: 85.2 CM/S
VAS LEFT ICA PROX EDV: 35.1 CM/S
VAS LEFT ICA PROX PSV: 100 CM/S
VAS LEFT ICA/CCA PSV: 1
VAS LEFT SUBCLAVIAN PROX EDV: 0 CM/S
VAS LEFT SUBCLAVIAN PROX PSV: 68.4 CM/S
VAS LEFT VERTEBRAL EDV: 19.2 CM/S
VAS LEFT VERTEBRAL PSV: 54.3 CM/S
VAS RIGHT ARM BP: 107 MMHG
VAS RIGHT CCA DIST EDV: 19.9 CM/S
VAS RIGHT CCA DIST PSV: 77.4 CM/S
VAS RIGHT CCA MID EDV: 29 CM/S
VAS RIGHT CCA MID PSV: 93.5 CM/S
VAS RIGHT CCA PROX EDV: 18.2 CM/S
VAS RIGHT CCA PROX PSV: 76.7 CM/S
VAS RIGHT ECA EDV: 12.9 CM/S
VAS RIGHT ECA PSV: 72.6 CM/S
VAS RIGHT ICA DIST EDV: 32.3 CM/S
VAS RIGHT ICA DIST PSV: 81.2 CM/S
VAS RIGHT ICA MID EDV: 28 CM/S
VAS RIGHT ICA MID PSV: 78.5 CM/S
VAS RIGHT ICA PROX EDV: 23.7 CM/S
VAS RIGHT ICA PROX PSV: 60.2 CM/S
VAS RIGHT ICA/CCA PSV: 0.9
VAS RIGHT SUBCLAVIAN PROX EDV: 0 CM/S
VAS RIGHT SUBCLAVIAN PROX PSV: 68.3 CM/S
VAS RIGHT VERTEBRAL EDV: 7.53 CM/S
VAS RIGHT VERTEBRAL PSV: 30.1 CM/S

## 2024-05-24 ENCOUNTER — TELEPHONE (OUTPATIENT)
Dept: CARDIOLOGY CLINIC | Age: 79
End: 2024-05-24

## 2024-05-24 NOTE — TELEPHONE ENCOUNTER
----- Message from Marcial Bustamante MD sent at 5/24/2024  8:31 AM EDT -----  Please notify patient that their scan shows mild plaque - stabl from prior scan  Repeat 1-2 years

## 2024-06-05 ENCOUNTER — HOSPITAL ENCOUNTER (OUTPATIENT)
Dept: PHYSICAL THERAPY | Age: 79
Setting detail: THERAPIES SERIES
Discharge: HOME | End: 2024-06-05
Payer: MEDICARE

## 2024-06-05 PROCEDURE — 97161 PT EVAL LOW COMPLEX 20 MIN: CPT | Performed by: PHYSICAL THERAPIST

## 2024-06-05 PROCEDURE — 97110 THERAPEUTIC EXERCISES: CPT | Performed by: PHYSICAL THERAPIST

## 2024-06-05 NOTE — PLAN OF CARE
mobilizations, and Soft Tissue Mobilization  Patient education on postural re-education and progression of HEP    Plan: POC initiated as per evaluation    Electronically Signed by Eugenie Currie, DUSTY  Date: 06/05/2024     Note: Portions of this note have been templated and/or copied from initial evaluation, reassessments and prior notes for documentation efficiency.    Note: If patient does not return for scheduled/recommended follow up visits, this note will serve as a discharge from care along with the most recent update on progress.

## 2024-06-18 ENCOUNTER — HOSPITAL ENCOUNTER (OUTPATIENT)
Dept: PHYSICAL THERAPY | Age: 79
Setting detail: THERAPIES SERIES
Discharge: HOME | End: 2024-06-18
Payer: MEDICARE

## 2024-06-18 PROCEDURE — 97140 MANUAL THERAPY 1/> REGIONS: CPT | Performed by: PHYSICAL THERAPIST

## 2024-06-18 PROCEDURE — 97110 THERAPEUTIC EXERCISES: CPT | Performed by: PHYSICAL THERAPIST

## 2024-06-18 NOTE — FLOWSHEET NOTE
[facility]  [address], [place] [address] office: [phone] fax: [phone]     Physical Therapy Initial Evaluation Certification      Dear Reece Dejesus MD,    We had the pleasure of evaluating the following patient for physical therapy services at [facility].  A summary of our findings can be found in the initial assessment below.  This includes our plan of care.  If you have any questions or concerns regarding these findings, please do not hesitate to contact me at the office phone number listed above.  Thank you for the referral.     Physician Signature:_______________________________Date:__________________  By signing above (or electronic signature), therapist’s plan is approved by physician       Physical Therapy: TREATMENT/PROGRESS NOTE   Patient: Erick Soto Jr. (79 y.o. male)   Examination Date: 2024   :  1945 MRN: [id number]   Visit #: 2   Insurance Allowable Auth Needed   bmn []Yes    []No    Insurance: Payor: MEDICARE / Plan: MEDICARE PART A AND B / Product Type: *No Product type* /   Insurance ID: [id number] - (Medicare)  Secondary Insurance (if applicable): BCBS   Treatment Diagnosis:   Right hip pain M25.551   Medical Diagnosis:  Trochanteric bursitis, right hip [M70.61]  Presence of right artificial hip joint [Z96.641]  Iliotibial band syndrome, right leg [M76.31]  Unilateral primary osteoarthritis, right knee [M17.11]  Pain in right knee [M25.561]   Referring Physician: Reece Dejesus MD  PCP: Abraham Estrada MD     Plan of care signed (Y/N):     Date of Patient follow up with Physician:      Progress Report/POC: NO  POC update due: (10 visits /OR AUTH LIMITS, whichever is less)  2024                                             Precautions/ Contra-indications:           Latex allergy:  NO  Pacemaker:    NO  Contraindications for Manipulation: None  Date of Surgery: Dec 7,

## 2025-04-29 NOTE — PROGRESS NOTES
No dysuria, trouble voiding, or hematuria.  Musculoskeletal:  No gait disturbance, weakness or joint complaints.  Integumentary: No rash or pruritis.  Neurological: No headache, diplopia, change in muscle strength, numbness or tingling. No change in gait, balance, coordination, mood, affect, memory, mentation, behavior.  Psychiatric: No anxiety, no depression.  Endocrine: No malaise, fatigue or temperature intolerance. No excessive thirst, fluid intake, or urination. No tremor.  Hematologic/Lymphatic: No abnormal bruising or bleeding, blood clots or swollen lymph nodes.  Allergic/Immunologic: No nasal congestion or hives.    Physical Examination:    /68   Pulse 68   Ht 1.956 m (6' 5.01\")   Wt 104.8 kg (231 lb)   SpO2 98%   BMI 27.39 kg/m²     Vitals:    05/05/25 0843   BP: 116/68   Pulse: 68   SpO2: 98%              Constitutional and General Appearance: NAD   Respiratory:  Normal excursion and expansion without use of accessory muscles  Resp Auscultation: Normal breath sounds without dullness  Cardiovascular:  The apical impulses not displaced  Heart tones are crisp and normal  Cervical veins are not engorged  The carotid upstroke is normal in amplitude and contour without delay or bruit  Normal S1S2, No S3, 1-2/6 Murmur  Peripheral pulses are symmetrical and full  There is no clubbing, cyanosis of the extremities.  No edema  Femoral Arteries: 2+ and equal  Pedal Pulses: 2+ and equal   Abdomen:  No masses or tenderness  Liver/Spleen: No Abnormalities Noted  Neurological/Psychiatric:  Alert and oriented in all spheres  Moves all extremities well  Exhibits normal gait balance and coordination  No abnormalities of mood, affect, memory, mentation, or behavior are noted    Carotid dopplers 2018  Summary    1. There is moderate plaque seen in the right internal carotid artery with  an estimated diameter reduction of <50%.  2. There is moderate plaque seen in the left internal carotid artery with an  estimated

## 2025-05-05 ENCOUNTER — OFFICE VISIT (OUTPATIENT)
Dept: CARDIOLOGY CLINIC | Age: 80
End: 2025-05-05
Payer: MEDICARE

## 2025-05-05 VITALS
WEIGHT: 231 LBS | SYSTOLIC BLOOD PRESSURE: 116 MMHG | OXYGEN SATURATION: 98 % | DIASTOLIC BLOOD PRESSURE: 68 MMHG | BODY MASS INDEX: 27.28 KG/M2 | HEART RATE: 68 BPM | HEIGHT: 77 IN

## 2025-05-05 DIAGNOSIS — R00.2 PALPITATIONS: ICD-10-CM

## 2025-05-05 DIAGNOSIS — I25.10 CORONARY ARTERY DISEASE INVOLVING NATIVE CORONARY ARTERY OF NATIVE HEART WITHOUT ANGINA PECTORIS: ICD-10-CM

## 2025-05-05 DIAGNOSIS — E78.2 MIXED HYPERLIPIDEMIA: ICD-10-CM

## 2025-05-05 DIAGNOSIS — I45.10 RBBB: ICD-10-CM

## 2025-05-05 DIAGNOSIS — I65.23 BILATERAL CAROTID ARTERY STENOSIS: ICD-10-CM

## 2025-05-05 DIAGNOSIS — I10 PRIMARY HYPERTENSION: Primary | ICD-10-CM

## 2025-05-05 PROCEDURE — G8427 DOCREV CUR MEDS BY ELIG CLIN: HCPCS | Performed by: INTERNAL MEDICINE

## 2025-05-05 PROCEDURE — 93000 ELECTROCARDIOGRAM COMPLETE: CPT | Performed by: INTERNAL MEDICINE

## 2025-05-05 PROCEDURE — 1159F MED LIST DOCD IN RCRD: CPT | Performed by: INTERNAL MEDICINE

## 2025-05-05 PROCEDURE — 3074F SYST BP LT 130 MM HG: CPT | Performed by: INTERNAL MEDICINE

## 2025-05-05 PROCEDURE — 1036F TOBACCO NON-USER: CPT | Performed by: INTERNAL MEDICINE

## 2025-05-05 PROCEDURE — 99214 OFFICE O/P EST MOD 30 MIN: CPT | Performed by: INTERNAL MEDICINE

## 2025-05-05 PROCEDURE — G8419 CALC BMI OUT NRM PARAM NOF/U: HCPCS | Performed by: INTERNAL MEDICINE

## 2025-05-05 PROCEDURE — 3078F DIAST BP <80 MM HG: CPT | Performed by: INTERNAL MEDICINE

## 2025-05-05 PROCEDURE — 1123F ACP DISCUSS/DSCN MKR DOCD: CPT | Performed by: INTERNAL MEDICINE

## 2025-05-05 RX ORDER — METOPROLOL SUCCINATE 25 MG/1
25 TABLET, EXTENDED RELEASE ORAL SEE ADMIN INSTRUCTIONS
Qty: 180 TABLET | Refills: 2 | Status: CANCELLED | OUTPATIENT
Start: 2025-05-05

## 2025-05-05 RX ORDER — EZETIMIBE 10 MG/1
10 TABLET ORAL DAILY
Qty: 90 TABLET | Refills: 3 | Status: SHIPPED | OUTPATIENT
Start: 2025-05-05

## 2025-05-05 RX ORDER — LISINOPRIL 10 MG/1
10 TABLET ORAL DAILY
Qty: 90 TABLET | Refills: 3 | Status: SHIPPED | OUTPATIENT
Start: 2025-05-05

## 2025-05-05 NOTE — PATIENT INSTRUCTIONS
~Call and let us know when you need Metoprolol refilled.   ~Repeat carotid dopplers in May of 2026   ~Call [facility] scheduling at [phone] to schedule testing      ~Send us abnormal Simónemilya reports through My Chart     Cardiac medications reviewed including indications and pertinent side effects. Medication list updated at this visit.   Patient verbalizes understanding of the need for treatment and education has been provided at today's visit. Additional education material will be provided in after visit summary.    Check blood pressure and heart rate at home a few times per week- keep a log with dates and times and bring to office visit   Regular exercise and following a healthy diet encouraged   Follow up with me in 1 year